# Patient Record
(demographics unavailable — no encounter records)

---

## 2024-11-12 NOTE — HISTORY OF PRESENT ILLNESS
[TextBox_4] : PATIENT IS HERE FOR BIRTHCONTROL RENEWAL PATIENT IS ON TRILOSPRINTEC 0.18 LMP 10/27/2024 [LMPDate] : 10/27/24 [TextBox_6] : 10/27/24 [FreeTextEntry1] : 10/27/24